# Patient Record
Sex: FEMALE | Race: WHITE | URBAN - METROPOLITAN AREA
[De-identification: names, ages, dates, MRNs, and addresses within clinical notes are randomized per-mention and may not be internally consistent; named-entity substitution may affect disease eponyms.]

---

## 2022-06-10 ENCOUNTER — OFFICE VISIT (OUTPATIENT)
Dept: URBAN - METROPOLITAN AREA CLINIC 59 | Facility: CLINIC | Age: 72
End: 2022-06-10
Payer: MEDICARE

## 2022-06-10 DIAGNOSIS — H25.13 AGE-RELATED NUCLEAR CATARACT, BILATERAL: Primary | ICD-10-CM

## 2022-06-10 DIAGNOSIS — H52.4 PRESBYOPIA: ICD-10-CM

## 2022-06-10 DIAGNOSIS — Z83.511 FAMILY HISTORY OF GLAUCOMA: ICD-10-CM

## 2022-06-10 PROCEDURE — 92004 COMPRE OPH EXAM NEW PT 1/>: CPT | Performed by: OPTOMETRIST

## 2022-06-10 PROCEDURE — 76514 ECHO EXAM OF EYE THICKNESS: CPT | Performed by: OPTOMETRIST

## 2022-06-10 ASSESSMENT — INTRAOCULAR PRESSURE
OS: 18
OD: 18

## 2022-06-10 ASSESSMENT — VISUAL ACUITY
OD: 20/25
OS: 20/20

## 2022-06-10 NOTE — IMPRESSION/PLAN
Impression: Age-related nuclear cataract, bilateral: H25.13. Plan: Discussed diagnosis of cataracts with patient. Patient has no significant visual complaints at this time and is able to perform all their daily activities. We will re-evaluate cataract on return visit unless patient notes any changes sooner. Glasses Rx was given today.

## 2022-06-10 NOTE — IMPRESSION/PLAN
Impression: Family history of glaucoma: Z83.511. *thick pachs Plan: No indication of glaucoma at this time. Strong family history (father and multiple paternal family members). Monitor yearly.